# Patient Record
Sex: FEMALE | Race: WHITE | NOT HISPANIC OR LATINO | URBAN - METROPOLITAN AREA
[De-identification: names, ages, dates, MRNs, and addresses within clinical notes are randomized per-mention and may not be internally consistent; named-entity substitution may affect disease eponyms.]

---

## 2017-05-17 ENCOUNTER — AMBULATORY - HEALTHEAST (OUTPATIENT)
Dept: LAB | Facility: CLINIC | Age: 62
End: 2017-05-17

## 2017-05-17 DIAGNOSIS — R53.83 FATIGUE: ICD-10-CM

## 2017-05-17 DIAGNOSIS — E56.9 VITAMIN DEFICIENCY: ICD-10-CM

## 2017-05-25 ENCOUNTER — OFFICE VISIT - HEALTHEAST (OUTPATIENT)
Dept: ENDOCRINOLOGY | Facility: CLINIC | Age: 62
End: 2017-05-25

## 2017-05-25 ENCOUNTER — AMBULATORY - HEALTHEAST (OUTPATIENT)
Dept: ENDOCRINOLOGY | Facility: CLINIC | Age: 62
End: 2017-05-25

## 2017-05-25 DIAGNOSIS — E16.2 HYPOGLYCEMIA: ICD-10-CM

## 2017-05-25 DIAGNOSIS — E56.9 VITAMIN DEFICIENCY: ICD-10-CM

## 2017-05-25 DIAGNOSIS — R53.83 FATIGUE: ICD-10-CM

## 2017-05-25 LAB
CREAT SERPL-MCNC: 0.74 MG/DL (ref 0.6–1.1)
GFR SERPL CREATININE-BSD FRML MDRD: >60 ML/MIN/1.73M2
HBA1C MFR BLD: 5.5 % (ref 3.5–6)

## 2017-05-25 ASSESSMENT — MIFFLIN-ST. JEOR: SCORE: 1948.86

## 2017-06-08 ENCOUNTER — OFFICE VISIT - HEALTHEAST (OUTPATIENT)
Dept: SLEEP MEDICINE | Facility: CLINIC | Age: 62
End: 2017-06-08

## 2017-06-08 ENCOUNTER — AMBULATORY - HEALTHEAST (OUTPATIENT)
Dept: SLEEP MEDICINE | Facility: CLINIC | Age: 62
End: 2017-06-08

## 2017-06-08 DIAGNOSIS — G47.33 OSA ON CPAP: ICD-10-CM

## 2017-06-08 ASSESSMENT — MIFFLIN-ST. JEOR: SCORE: 1947.96

## 2017-06-12 ENCOUNTER — COMMUNICATION - HEALTHEAST (OUTPATIENT)
Dept: ENDOCRINOLOGY | Facility: CLINIC | Age: 62
End: 2017-06-12

## 2017-06-12 DIAGNOSIS — E56.9 VITAMIN DEFICIENCY: ICD-10-CM

## 2017-06-12 DIAGNOSIS — R53.83 FATIGUE: ICD-10-CM

## 2017-06-21 ENCOUNTER — OFFICE VISIT - HEALTHEAST (OUTPATIENT)
Dept: FAMILY MEDICINE | Facility: CLINIC | Age: 62
End: 2017-06-21

## 2017-06-21 DIAGNOSIS — H61.23 BILATERAL IMPACTED CERUMEN: ICD-10-CM

## 2017-06-28 ENCOUNTER — OFFICE VISIT - HEALTHEAST (OUTPATIENT)
Dept: FAMILY MEDICINE | Facility: CLINIC | Age: 62
End: 2017-06-28
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
## 2017-06-28 ASSESSMENT — MIFFLIN-ST. JEOR: SCORE: 1934.35

## 2017-08-14 ENCOUNTER — COMMUNICATION - HEALTHEAST (OUTPATIENT)
Dept: ENDOCRINOLOGY | Facility: CLINIC | Age: 62
End: 2017-08-14

## 2017-08-14 DIAGNOSIS — E56.9 VITAMIN DEFICIENCY: ICD-10-CM

## 2017-08-14 DIAGNOSIS — R53.83 FATIGUE: ICD-10-CM

## 2017-10-06 ENCOUNTER — AMBULATORY - HEALTHEAST (OUTPATIENT)
Dept: NURSING | Facility: CLINIC | Age: 62
End: 2017-10-06

## 2017-11-20 ENCOUNTER — COMMUNICATION - HEALTHEAST (OUTPATIENT)
Dept: LAB | Facility: CLINIC | Age: 62
End: 2017-11-20

## 2017-11-21 ENCOUNTER — AMBULATORY - HEALTHEAST (OUTPATIENT)
Dept: FAMILY MEDICINE | Facility: CLINIC | Age: 62
End: 2017-11-21

## 2017-11-21 DIAGNOSIS — E16.2 HYPOGLYCEMIA: ICD-10-CM

## 2017-11-22 ENCOUNTER — AMBULATORY - HEALTHEAST (OUTPATIENT)
Dept: LAB | Facility: CLINIC | Age: 62
End: 2017-11-22

## 2017-11-22 DIAGNOSIS — E16.2 HYPOGLYCEMIA: ICD-10-CM

## 2017-11-22 LAB
CREAT SERPL-MCNC: 0.72 MG/DL (ref 0.6–1.1)
GFR SERPL CREATININE-BSD FRML MDRD: >60 ML/MIN/1.73M2
HBA1C MFR BLD: 5.4 % (ref 3.5–6)

## 2017-11-30 ENCOUNTER — OFFICE VISIT - HEALTHEAST (OUTPATIENT)
Dept: ENDOCRINOLOGY | Facility: CLINIC | Age: 62
End: 2017-11-30

## 2017-11-30 DIAGNOSIS — E03.9 HYPOTHYROID: ICD-10-CM

## 2017-11-30 ASSESSMENT — MIFFLIN-ST. JEOR: SCORE: 1979.71

## 2017-12-06 ENCOUNTER — HOSPITAL ENCOUNTER (OUTPATIENT)
Dept: ULTRASOUND IMAGING | Facility: CLINIC | Age: 62
Discharge: HOME | End: 2017-12-06
Attending: FAMILY MEDICINE

## 2017-12-06 ENCOUNTER — OFFICE VISIT - HEALTHEAST (OUTPATIENT)
Dept: FAMILY MEDICINE | Facility: CLINIC | Age: 62
End: 2017-12-06

## 2017-12-06 DIAGNOSIS — E04.1 THYROID NODULE: ICD-10-CM

## 2017-12-06 ASSESSMENT — MIFFLIN-ST. JEOR: SCORE: 1966.1

## 2017-12-08 ENCOUNTER — COMMUNICATION - HEALTHEAST (OUTPATIENT)
Dept: FAMILY MEDICINE | Facility: CLINIC | Age: 62
End: 2017-12-08

## 2017-12-08 ENCOUNTER — AMBULATORY - HEALTHEAST (OUTPATIENT)
Dept: FAMILY MEDICINE | Facility: CLINIC | Age: 62
End: 2017-12-08

## 2017-12-08 DIAGNOSIS — E04.2 MULTIPLE THYROID NODULES: ICD-10-CM

## 2017-12-11 ENCOUNTER — AMBULATORY - HEALTHEAST (OUTPATIENT)
Dept: FAMILY MEDICINE | Facility: CLINIC | Age: 62
End: 2017-12-11

## 2017-12-11 DIAGNOSIS — E04.2 MULTIPLE THYROID NODULES: ICD-10-CM

## 2017-12-13 ENCOUNTER — OFFICE VISIT - HEALTHEAST (OUTPATIENT)
Dept: FAMILY MEDICINE | Facility: CLINIC | Age: 62
End: 2017-12-13

## 2017-12-13 ENCOUNTER — HOSPITAL ENCOUNTER (OUTPATIENT)
Dept: ULTRASOUND IMAGING | Facility: CLINIC | Age: 62
Discharge: HOME | End: 2017-12-13
Attending: FAMILY MEDICINE

## 2017-12-13 DIAGNOSIS — R73.03 PRE-DIABETES: ICD-10-CM

## 2017-12-13 DIAGNOSIS — E04.2 MULTIPLE THYROID NODULES: ICD-10-CM

## 2017-12-13 DIAGNOSIS — E03.9 HYPOTHYROIDISM: ICD-10-CM

## 2017-12-13 DIAGNOSIS — L30.9 ECZEMA: ICD-10-CM

## 2017-12-13 DIAGNOSIS — Z00.00 ROUTINE GENERAL MEDICAL EXAMINATION AT A HEALTH CARE FACILITY: ICD-10-CM

## 2017-12-13 RX ORDER — TRIAMCINOLONE ACETONIDE 1 MG/G
CREAM TOPICAL
Qty: 15 G | Refills: 0 | Status: SHIPPED | OUTPATIENT
Start: 2017-12-13

## 2017-12-13 ASSESSMENT — MIFFLIN-ST. JEOR: SCORE: 1964.4

## 2017-12-14 ENCOUNTER — COMMUNICATION - HEALTHEAST (OUTPATIENT)
Dept: ENDOCRINOLOGY | Facility: CLINIC | Age: 62
End: 2017-12-14

## 2017-12-14 DIAGNOSIS — E56.9 VITAMIN DEFICIENCY: ICD-10-CM

## 2017-12-14 DIAGNOSIS — R53.83 FATIGUE: ICD-10-CM

## 2017-12-14 LAB
LAB AP CHARGES (HE HISTORICAL CONVERSION): ABNORMAL
LAB AP INITIAL CYTO EVAL (HE HISTORICAL CONVERSION): ABNORMAL
LAB MED GENERAL PATH INTERP (HE HISTORICAL CONVERSION): ABNORMAL
PATH REPORT.COMMENTS IMP SPEC: ABNORMAL
PATH REPORT.FINAL DX SPEC: ABNORMAL
PATH REPORT.MICROSCOPIC SPEC OTHER STN: ABNORMAL
PATH REPORT.RELEVANT HX SPEC: ABNORMAL
SPECIMEN DESCRIPTION: ABNORMAL

## 2017-12-18 ENCOUNTER — COMMUNICATION - HEALTHEAST (OUTPATIENT)
Dept: FAMILY MEDICINE | Facility: CLINIC | Age: 62
End: 2017-12-18

## 2017-12-18 DIAGNOSIS — C73: ICD-10-CM

## 2017-12-19 ENCOUNTER — COMMUNICATION - HEALTHEAST (OUTPATIENT)
Dept: ENDOCRINOLOGY | Facility: CLINIC | Age: 62
End: 2017-12-19

## 2017-12-19 ENCOUNTER — COMMUNICATION - HEALTHEAST (OUTPATIENT)
Dept: FAMILY MEDICINE | Facility: CLINIC | Age: 62
End: 2017-12-19

## 2017-12-28 ENCOUNTER — OFFICE VISIT - HEALTHEAST (OUTPATIENT)
Dept: ENDOCRINOLOGY | Facility: CLINIC | Age: 62
End: 2017-12-28

## 2017-12-28 DIAGNOSIS — C73 THYROID CANCER (H): ICD-10-CM

## 2017-12-28 LAB
CREAT SERPL-MCNC: 0.72 MG/DL (ref 0.6–1.1)
GFR SERPL CREATININE-BSD FRML MDRD: >60 ML/MIN/1.73M2

## 2017-12-28 ASSESSMENT — MIFFLIN-ST. JEOR: SCORE: 1973.02

## 2017-12-29 LAB — HBA1C MFR BLD: 5.3 % (ref 4.2–6.1)

## 2018-01-02 ENCOUNTER — COMMUNICATION - HEALTHEAST (OUTPATIENT)
Dept: ENDOCRINOLOGY | Facility: CLINIC | Age: 63
End: 2018-01-02

## 2018-01-02 DIAGNOSIS — E03.9 HYPOTHYROIDISM: ICD-10-CM

## 2018-01-02 RX ORDER — LEVOTHYROXINE SODIUM 75 UG/1
TABLET ORAL
Qty: 90 TABLET | Refills: 1 | Status: SHIPPED | OUTPATIENT
Start: 2018-01-02

## 2018-01-02 RX ORDER — LEVOTHYROXINE SODIUM 50 UG/1
TABLET ORAL
Qty: 90 TABLET | Refills: 1 | Status: SHIPPED | OUTPATIENT
Start: 2018-01-02

## 2018-01-15 ENCOUNTER — RECORDS - HEALTHEAST (OUTPATIENT)
Dept: ADMINISTRATIVE | Facility: OTHER | Age: 63
End: 2018-01-15

## 2021-03-18 ENCOUNTER — AMBULATORY - HEALTHEAST (OUTPATIENT)
Dept: NURSING | Facility: CLINIC | Age: 66
End: 2021-03-18

## 2021-04-08 ENCOUNTER — AMBULATORY - HEALTHEAST (OUTPATIENT)
Dept: NURSING | Facility: CLINIC | Age: 66
End: 2021-04-08

## 2021-05-25 ENCOUNTER — RECORDS - HEALTHEAST (OUTPATIENT)
Dept: ADMINISTRATIVE | Facility: CLINIC | Age: 66
End: 2021-05-25

## 2021-05-31 VITALS — HEIGHT: 69 IN | BODY MASS INDEX: 43.4 KG/M2 | WEIGHT: 293 LBS

## 2021-05-31 VITALS — BODY MASS INDEX: 43.4 KG/M2 | HEIGHT: 69 IN | WEIGHT: 293 LBS

## 2021-05-31 VITALS — WEIGHT: 293 LBS | HEIGHT: 69 IN | BODY MASS INDEX: 43.4 KG/M2

## 2021-05-31 VITALS — BODY MASS INDEX: 42.46 KG/M2 | WEIGHT: 287.5 LBS

## 2021-05-31 VITALS — HEIGHT: 69 IN | WEIGHT: 291 LBS | BODY MASS INDEX: 43.1 KG/M2

## 2021-06-02 ENCOUNTER — RECORDS - HEALTHEAST (OUTPATIENT)
Dept: ADMINISTRATIVE | Facility: CLINIC | Age: 66
End: 2021-06-02

## 2021-06-10 NOTE — PROGRESS NOTES
Progress Note    Reason for Visit:      Progress Note:    HPI:    This pleasant 61-year-old female patient is here for follow-up because of symptoms of hypoglycemia.    Component      Latest Ref Rng & Units 11/18/2016 5/17/2017   Creatinine      0.60 - 1.10 mg/dL 0.67    GFR MDRD Af Amer      >60 mL/min/1.73m2 >60    GFR MDRD Non Af Amer      >60 mL/min/1.73m2 >60    TSH      0.30 - 5.00 uIU/mL 4.20 3.24   Vitamin D, Total (25-Hydroxy)      30.0 - 80.0 ng/mL 35.6    Free T4      0.7 - 1.8 ng/dL 0.9 0.9   T3, Total      45 - 175 ng/dL 104 98   Thyroid Peroxidase Ab      0.0 - 5.6 IU/mL <3.0 <3.0         Review of Systems:    Nervous System: No headache, dizziness, fainting or memory loss. No tingling sensation of hand or feet.  Ears: No hearing loss or ringing in the ears  Eyes: No blurring of vision, redness, itching or dryness.  Nose: No nosebleed or loss of smell  Mouth: No mouth sores or loss of taste  Throat: No hoarseness or difficulty swallowing  Neck: No enlarged thyroid or lymph nodes.  Heart: No chest pain, palpitation or irregular heartbeat. No swelling of hands or feet  Lungs: No shortness of breath, cough, night sweats, wheezing or hemoptysis.  Gastrointestinal: No nausea or vomiting, constipation or diarrhea.  No acid reflux, abdominal pain or blood in stools.  Kidney/Bladdr: No polyuria, polydipsia, nocturia or hematuria.  Genital/Sexual: No loss of libido  Skin: No rash, hair loss or hirsutism.  No abnormal striae  Muscles/Joints/Bones: No morning stiffness, muscle aches and pain or loss of height.    Current Medications:  Current Outpatient Prescriptions   Medication Sig     biotin 1 mg tablet Take 1,250 mcg by mouth daily.     blood glucose test strips Use to test blood sugars when you feel shakyDispense brand per patient's insurance at pharmacy discretion.     blood-glucose meter (CONTOUR NEXT EZ METER) Misc Use to test blood sugars when shaky     CALCIUM CARBONATE (CALCIUM 500 ORAL) Take by mouth.      cholecalciferol, vitamin D3, 1,000 unit capsule Take 1,000 Units by mouth daily.     chromium 200 mcg Tab Take by mouth.     echinacea purpurea extract (ECHINACEA) 125 mg Tab Take 1 capsule by mouth daily.     generic lancets Use to test blood sugars when you feel shaky     anabella, zingiber officinalis, 500 mg cap Take by mouth.     GLUCOSAMINE HCL (GLUCOSAMINE, BULK, MISC) Use 367 mg As Directed.     levothyroxine (SYNTHROID, LEVOTHROID) 25 MCG tablet Take 1 tablet (25 mcg total) by mouth daily.     magnesium carbonate-asafetida 250 mg/5 mL Susp Take by mouth.     metFORMIN (GLUCOPHAGE) 500 MG tablet Take 1 tablet with supper     MULTIVITAMIN ORAL Take by mouth.     omega-3 fatty acids-vitamin E (FISH OIL) 1,000 mg cap Take 1 capsule by mouth daily.     Seth's wort 150 mg cap Take by mouth.     zinc 15 mg Tab Take 5 mg by mouth.       Patients Active Problems:  Patient Active Problem List   Diagnosis     Tingling (Paresthesia)     Primary Snoring     Endometrial Polyps     Hammer Toe     Fatigue     Hypoglycemia       History:   reports that she quit smoking about 32 years ago. She started smoking about 38 years ago. She has never used smokeless tobacco. She reports that she drinks alcohol. She reports that she does not use illicit drugs.   reports that she quit smoking about 32 years ago. She started smoking about 38 years ago. She has never used smokeless tobacco. She reports that she drinks alcohol. She reports that she does not use illicit drugs.  History   Smoking Status     Former Smoker     Start date: 6/13/1978     Quit date: 6/13/1984   Smokeless Tobacco     Never Used      reports that she quit smoking about 32 years ago. She started smoking about 38 years ago. She has never used smokeless tobacco. She reports that she drinks alcohol. She reports that she does not use illicit drugs.  History   Sexual Activity     Sexual activity: Not Currently     No past medical history on file.  Family History    Problem Relation Age of Onset     Hyperlipidemia Mother      Cancer Mother      gallbladder     Stroke Mother      Aneurysm Father      Multiple sclerosis Maternal Aunt      Heart disease Maternal Grandfather      Breast cancer Paternal Grandmother      Lupus Maternal Aunt      No past medical history on file.  Past Surgical History:   Procedure Laterality Date     [place] REMOVAL OF TONSILS,<11 Y/O      Description: Tonsillectomy;  Recorded: 10/26/2013;     uterine polyps         Vitals   vitals were not taken for this visit.        Exam  General appearance: The patient looked well, not in acute distress.  Eyes: no evidence of thyroid eye disease.   Retinal exam: No evidence of diabetic retinopathy.  Mouth and Throat: Normal  Neck: No evidence of thyromegaly, enlarged lymph node or tenderness  Chest: Trachea is central. Chest is clear to auscultation and percussion. Breat sounds are normal.  Cardiovascular exam: JVP is not raised. Heart sounds are normal, no murmurs or rub  Peripheral pulses are palpable.   Abdomen: No masses or tenderness.    Back: No vertebral tenderness or kyphosis.  Extremities: No evidence of leg edema.   Skin: Normal to touch.  No abnormal striae  Neurologic exam:  Visual fields are intact by confrontation, grossly intact. No evidence of peripheral neuropathy.  Detailed foot exam normal.        Diagnosis:  Encounter Diagnoses   Name Primary?     Hypoglycemia        Orders:   No orders of the defined types were placed in this encounter.        Assessment and Plan:  Hypoglycemia did extensive lab testing shows that she has high insulin with A1c of 5.7 i.e. insulin resistance I put her metformin 500 mg with supper.  She said that her symptoms of hypoglycemia resolved with especially she said that she would experience shakiness when she drinks caffeine and will stop when she stopped that together with the metformin she is feeling better.    Her TSH was 4.2 I put her on Synthroid 25 mcg daily TSH  came down to 3.24 free T4 0.9 total T3 is 98 she feels much better better energy more confident better mood rollover.    We will increase her Synthroid to, 25 mcg on Monday Wednesday Friday the rest of the day she will take tablet on one tablet only.    Vitamin D 35.6 she is taking 1000 units a day with continue except    I discussed all of these data with the patient should return to clinic in 6 months with labs before I did spend 40 minutes with the patient more than 50% was spent on counseling and managing her care.

## 2021-06-11 NOTE — PROGRESS NOTES
"Assessment:     Rash which is suspicious for bulls-eye rash or erythema chronicum migrans. No specific hx for exposure other than yard work.   No symptoms at present.     Plan:     Because we are in an endemic area, I will go ahead and treat her. This will be with Doxycyclline 100 mg po bid x 21 days. We dscussed   How to take this, and to avoid sun exposure. Can use probiotics along with this.   FU will be prn.     Subjective:      Amy Ivory is a 61 y.o. female who presents to the clinic for a rash, presumed bug bit on her L   Upper abdomen. She noticed this yesterday. Since then, it has gotten a little bigger. No itching, no warmth. She was working   In her garden before this. She denies HA, sore throat, myalgias, cough.     Review of Systems  As noted above. No fevers, no change in BM. No N/V.     Objective:     /72 (Patient Site: Left Arm, Patient Position: Sitting, Cuff Size: Adult Large)  Ht 5' 9\" (1.753 m)  Wt (!) 291 lb (132 kg)  BMI 42.97 kg/m2  Exam of her skin shows a rash on the L upper chest. This has a central red area and then a clearing. This is close to a classic bulls-eye rash.   Mild surrounding redness measuring about 2 1/2 cm. Throat - Posterior pharynx is non-red. No exudate or adenopathy. No cervical adenopathy anteriorly or posteriorly.         "

## 2021-06-11 NOTE — PROGRESS NOTES
Order for Durable Medical Equipment was processed and equipment ordered.   DME provider: [facility]   Date Faxed: 6/8/17  Ordering Provider: Dr. Tsai  Equipment ordered: Supplies

## 2021-06-11 NOTE — PROGRESS NOTES
Patient is a 61-year-old female presents today with hearing loss as well as pulsatile tinnitus in her left ear for several days.      Patient swims a lot, and usually when this happens, she can put eardrops in and then she can feel a bubbling and a fizzing and then her hearing is restored.  She notes the pulsatile tinnitus for the past 5-6 months.  She saw chiropractor, who made an ear adjustment, and it improved for a very short time.  She notes that her hearing in her left ear has gone totally silent in the past several days.  She does have a history of sleep apnea, and she was told by the pulmonologist that the CPAP can dry her inner ears out causing pulsatile tinnitus.  Is contemplating meeting with ENT but decided to come here first to see if there is something easy to do.    Objective     /78 (Patient Site: Left Arm, Patient Position: Sitting)  Pulse 65  Wt (!) 287 lb 8 oz (130.4 kg)  SpO2 99%  Breastfeeding? No  BMI 42.46 kg/m2  General appearance: alert, appears stated age and cooperative  Ears: Bilateral canals blocked by dry, impacted cerumen  Nose: Nares normal. Septum midline. Mucosa normal. No drainage or sinus tenderness.  Throat: lips, mucosa, and tongue normal; teeth and gums normal     Amy was seen today for ear problem.    Diagnoses and all orders for this visit:    Bilateral impacted cerumen  -     Ear Cerumen Removal-Clinic  Cerumen removed successfully with irrigation.  Discussed prevention with debrox.  Recommend no qtips.

## 2021-06-11 NOTE — PROGRESS NOTES
Dear Dr. Ashley Foy MD  [address] [place] [address],    Thank you for the opportunity to participate in the care of Amy Ivory.     She is a 61 y.o. y/o female patient who comes to the sleep medicine clinic for follow up.    She was diagnosed with mild KENNY (11.9)  and has been using CPAP of 9 cwp.    Her symptoms are improved since she started using CPAP. She is not snoring with her device. She denies any PAP intolerance. She is using the device every night and tolerates the pressure well.       Residual AHI: 0.6  Leak: low  Compliance: 100%    Mask Tolerance: excellent  Skin irritation: no    She bought a Z1 device online and uses this device when she travels. I checked the Z1 and the pressure is perfect.      Patient Active Problem List   Diagnosis     Tingling (Paresthesia)     Primary Snoring     Endometrial Polyps     Hammer Toe     Fatigue     Hypoglycemia         Past Surgical History:   Procedure Laterality Date     [place] REMOVAL OF TONSILS,<12 Y/O      Description: Tonsillectomy;  Recorded: 10/26/2013;     uterine polyps         Social History     Social History     Marital status:      Spouse name: N/A     Number of children: N/A     Years of education: N/A     Occupational History     Not on file.     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    Current Outpatient Prescriptions   Medication Sig Dispense Refill     biotin 1 mg tablet Take 1,250 mcg by mouth daily.       blood glucose test strips Use to test blood sugars when you feel shakyDispense brand per patient's insurance at pharmacy discretion. 100 each 2     blood-glucose meter (CONTOUR NEXT EZ METER) Misc Use to test blood sugars when shaky 1 each 0     CALCIUM CARBONATE (CALCIUM 500 ORAL) Take by mouth.       cholecalciferol, vitamin D3, 1,000 unit capsule Take 1,000 Units by mouth daily.       chromium 200 mcg Tab Take by mouth.       echinacea purpurea extract (ECHINACEA) 125 mg Tab Take 1 capsule by mouth daily.       generic lancets Use to test blood sugars when you feel shaky 100 each 1     anabella, zingiber officinalis, 500 mg cap Take by mouth.       GLUCOSAMINE HCL (GLUCOSAMINE, BULK, MISC) Use 367 mg As Directed.       levothyroxine (SYNTHROID, LEVOTHROID) 25 MCG tablet 2 tablets on Monday Wednesday Friday the rest of the days take 1 tablet only. 100 tablet 2     magnesium carbonate-asafetida 250 mg/5 mL Susp Take by mouth.       metFORMIN (GLUCOPHAGE) 500 MG tablet Take 1 tablet with supper 30 tablet 6     MULTIVITAMIN ORAL Take by mouth.       omega-3 fatty acids-vitamin E (FISH OIL) 1,000 mg cap Take 1 capsule by mouth daily.       Seth's wort 150 mg cap Take by mouth.       zinc 15 mg Tab Take 5 mg by mouth.       No current facility-administered medications for this visit.        Allergies   Allergen Reactions     Codeine Nausea Only       Physical Exam:  Pulse 67  Ht 5' 9\" (1.753 m)  Wt (!) 294 lb (133.4 kg)  SpO2 97%  BMI 43.42 kg/m2  BMI:Body mass index is 43.42 kg/(m^2).   GEN: NAD, obese  Neurological: Alert, oriented to time, place, and person.  Psych: normal mood, normal affect     Labs/Studies:     I reviewed the efficacy and compliance report from his device. Data summarized on the HPI and the CPAP compliance " flow sheet.     Lab Results   Component Value Date    WBC 5.4 06/13/2016    HGB 14.8 06/13/2016    HCT 45.6 06/13/2016    MCV 91 06/13/2016     06/13/2016         Chemistry        Component Value Date/Time     06/13/2016 0942    K 4.3 05/25/2017 1403     06/13/2016 0942    CO2 29 06/13/2016 0942    BUN 11 06/13/2016 0942    CREATININE 0.74 05/25/2017 1403     (H) 11/18/2016 0821        Component Value Date/Time    CALCIUM 9.5 11/18/2016 0821    ALKPHOS 94 06/13/2016 0942    AST 19 06/13/2016 0942    ALT 17 06/13/2016 0942    BILITOT 0.5 06/13/2016 0942            Assessment and Plan:  In summary Amy Ivory is a 61 y.o. year old female who is here for follow up.    1. Obstructive Sleep Apnea  Obstructive Sleep Apnea is well controlled with current CPAP settings.  Continue with P= 9 cwp.  I will renew supplies    We counseled the patient on the importannce of using her CPAP device every night and the risks of not treating sleep apnea.      Patient verbalized understanding of these issues, agrees with the plan and all questions were answered today. Patient was given an opportuntity to voice any other symptoms or concerns not listed above. Patient did not have any other symptoms or concerns.      Patient told to return in 24-36 months. Patient instructed to stop at  to schedule appointment before leaving today.    MD MINGO Haywood Board Certified in Internal Medicine and Sleep Medicine  [facility].

## 2021-06-14 NOTE — PROGRESS NOTES
Subjective: Sabrina is a 62-year-old lady who is here to go over her [facility] screening.  She had this done recently and got an abnormal results on her thyroid scan.  During screening for her carotid artery status, incidentally was noted possible nodules in her thyroid.  She is here to follow-up that finding.    Objective: Vital signs: Blood pressure 118/72 height is 5 feet 9 weight is 298 pounds.  This lady is alert and in no acute distress.  I went over with her her other [facility] readings.  Her current signs of plaque.  She is not in atrial fibrillation.  She does not have an abdominal aortic aneurysm.  She has no peripheral arterial disease.  Body mass index was in an unacceptable zone.  They did ultrasounds of her carotids and again while looking in at those on abnormality was noted of the thyroid.  She brings these scans with her but I am unable to adequately reach him.  She has no history of thyroid problems.  She is feeling well.  Exam-neck exam shows no thyromegaly.  I am feeling I think a small nodule in the right lower lobe.  I cannot feel any other nodules.    Assessment: Possible thyroid nodule    Plan: She is referred for an ultrasound of her thyroid with further results based on that.

## 2021-06-14 NOTE — PROGRESS NOTES
Progress Note    Reason for Visit:  Chief Complaint     Follow-up          Progress Note:    HPI:     This patient is here for follow-up because of multiple medical problems.    Component      Latest Ref Rng & Units 5/17/2017 5/25/2017 11/22/2017   Creatinine      0.60 - 1.10 mg/dL  0.74 0.72   GFR MDRD Af Amer      >60 mL/min/1.73m2  >60 >60   GFR MDRD Non Af Amer      >60 mL/min/1.73m2  >60 >60   TSH      0.30 - 5.00 uIU/mL 3.24  3.71   Free T4      0.7 - 1.8 ng/dL 0.9  1.0   Hemoglobin A1c      3.5 - 6.0 %  5.5 5.4   T3, Total      45 - 175 ng/dL 98  90   Thyroid Peroxidase Ab      0.0 - 5.6 IU/mL <3.0     Potassium      3.5 - 5.0 mmol/L  4.3 4.7       Review of Systems:    Nervous System: No headache, dizziness, fainting or memory loss. No tingling sensation of hand or feet.  Ears: No hearing loss or ringing in the ears  Eyes: No blurring of vision, redness, itching or dryness.  Nose: No nosebleed or loss of smell  Mouth: No mouth sores or loss of taste  Throat: No hoarseness or difficulty swallowing  Neck: No enlarged thyroid or lymph nodes.  Heart: No chest pain, palpitation or irregular heartbeat. No swelling of hands or feet  Lungs: No shortness of breath, cough, night sweats, wheezing or hemoptysis.  Gastrointestinal: No nausea or vomiting, constipation or diarrhea.  No acid reflux, abdominal pain or blood in stools.  Kidney/Bladdr: No polyuria, polydipsia, nocturia or hematuria.  Genital/Sexual: No loss of libido  Skin: No rash, hair loss or hirsutism.  No abnormal striae  Muscles/Joints/Bones: No morning stiffness, muscle aches and pain or loss of height.    Current Medications:  Current Outpatient Prescriptions   Medication Sig     biotin 1 mg tablet Take 1,250 mcg by mouth daily.     blood glucose test strips Use to test blood sugars when you feel shakyDispense brand per patient's insurance at pharmacy discretion.     blood-glucose meter (CONTOUR NEXT EZ METER) Misc Use to test blood sugars when shaky      CALCIUM CARBONATE (CALCIUM 500 ORAL) Take by mouth.     cholecalciferol, vitamin D3, 1,000 unit capsule Take 1,000 Units by mouth daily.     chromium 200 mcg Tab Take by mouth.     echinacea purpurea extract (ECHINACEA) 125 mg Tab Take 1 capsule by mouth daily.     generic lancets Use to test blood sugars when you feel shaky     anabella, zingiber officinalis, 500 mg cap Take by mouth.     GLUCOSAMINE HCL (GLUCOSAMINE, BULK, MISC) Use 367 mg As Directed.     levothyroxine (SYNTHROID, LEVOTHROID) 25 MCG tablet 2 tablets on Monday Wednesday Friday the rest of the days take 1 tablet only.     magnesium carbonate-asafetida 250 mg/5 mL Susp Take by mouth.     metFORMIN (GLUCOPHAGE) 500 MG tablet TAKE 1 TABLET BY MOUTH WITH DINNER     MULTIVITAMIN ORAL Take by mouth.     omega-3 fatty acids-vitamin E (FISH OIL) 1,000 mg cap Take 1 capsule by mouth daily.     [place]'s wort 150 mg cap Take by mouth.     zinc 15 mg Tab Take 5 mg by mouth.       Patients Active Problems:  Patient Active Problem List   Diagnosis     Tingling (Paresthesia)     Primary Snoring     Endometrial Polyps     Hammer Toe     Fatigue     Hypoglycemia       History:   reports that she quit smoking about 33 years ago. She started smoking about 39 years ago. She has never used smokeless tobacco. She reports that she drinks alcohol. She reports that she does not use illicit drugs.   reports that she quit smoking about 33 years ago. She started smoking about 39 years ago. She has never used smokeless tobacco. She reports that she drinks alcohol. She reports that she does not use illicit drugs.  History   Smoking Status     Former Smoker     Start date: 6/13/1978     Quit date: 6/13/1984   Smokeless Tobacco     Never Used      reports that she quit smoking about 33 years ago. She started smoking about 39 years ago. She has never used smokeless tobacco. She reports that she drinks alcohol. She reports that she does not use illicit drugs.  History   Sexual  "Activity     Sexual activity: Not Currently     No past medical history on file.  Family History   Problem Relation Age of Onset     Hyperlipidemia Mother      Cancer Mother      gallbladder     Stroke Mother      Aneurysm Father      Multiple sclerosis Maternal Aunt      Heart disease Maternal Grandfather      Breast cancer Paternal Grandmother      Lupus Maternal Aunt      No past medical history on file.  Past Surgical History:   Procedure Laterality Date     [place] REMOVAL OF TONSILS,<12 Y/O      Description: Tonsillectomy;  Recorded: 10/26/2013;     uterine polyps         Vitals   height is 5' 9\" (1.753 m) and weight is 301 lb (136.5 kg) (abnormal). Her blood pressure is 134/80.         Exam  General appearance: The patient looked well, not in acute distress.  Eyes: no evidence of thyroid eye disease.   Retinal exam: No evidence of diabetic retinopathy.  Mouth and Throat: Normal  Neck: No evidence of thyromegaly, enlarged lymph node or tenderness  Chest: Trachea is central. Chest is clear to auscultation and percussion. Breat sounds are normal.  Cardiovascular exam: JVP is not raised. Heart sounds are normal, no murmurs or rub  Peripheral pulses are palpable.   Abdomen: No masses or tenderness.  Back: No vertebral tenderness or kyphosis.  Extremities: No evidence of leg edema.   Skin: Normal to touch.  No abnormal striae  Neurologic exam:  Visual fields are intact by confrontation, grossly intact. No evidence of peripheral neuropathy.  Detailed foot exam normal.        Diagnosis:  No diagnosis found.    Orders:   No orders of the defined types were placed in this encounter.        Assessment and Plan: Hypothyroidism she is currently on Synthroid 25 mcg 2 tablets on Monday Wednesday Friday the rest of the day she takes 1 tablet only.    Her TSH is 3.7 she does feel fatigued and tired I will increase the dose to, 50 mcg daily.    Hypoglycemia I put the patient on metformin 500 mg daily A1c is continuing to come down " 5.6-5.4.  The patient feels well we will continue with that.    She takes vitamin D 2000 units a day calcium 1 a day.    The patient is going to her insurance will allow her only to see [place] I would renew her prescription for 1 year and I told her to check her labs if she felt unwell during this..    Patient return to clinic in 1 year I did spend 40 minutes with the patient more than 50% was spent in counseling and managing his care.

## 2021-06-14 NOTE — PROGRESS NOTES
FEMALE PREVENTIVE EXAM    Assessment & Plan   1. Routine general medical examination at a health care facility:  Not fasting today, labs recently completed.  Will be due for mammography, pap and colonoscopy in 2018.  States insurance is switching, she will be with a different provider.      2. Multiple thyroid nodules:  Cleared for FNA procedure today.     3. Eczema  - triamcinolone (KENALOG) 0.1 % cream; Apply twice daily to affected area  Dispense: 15 g; Refill: 0    4. Hypothyroidism    5. Pre-diabetes    Recommend repeat pap smear if normal every five years  Alcohol use, safety and moderation discussed.  Recommended adequate calcium intake/osteoporosis prevention.  Discussed colon cancer screening at age 50, 45 if -American.  Diet discuss, including moderation of portions sizes, avoiding eating out and fast food and increase in fruits and vegetables.  Discussed safe sex practices.  Discussed & recommended seat belt (& motorcycle helmet) use.      Mayelin Artis CNP    Subjective:   Chief Complaint:  Pre-op Exam (12/13 - [facility] - fine needle aspiration, thyroid nodule) and Eczema (rash on L hand)    HPI:  Amy Ivory is a 62 y.o. female who presents for routine physical exam.  She is a patient of Dr. Foy.  PMH notable for insulin resistance, hypothyroidism, endometrial polyps, otherwise negative.     She was recently found to have two nodules on her thyroid. Right lobe with 2.7cm nodule and left lobe with 3.0cm nodule.  She will be undergoing FNA later today.  She states she was told she needed a physical prior to the procedure though not necessarily a preop?  She has had several procedures in the past, tolerated local and general anesthesia.  No cardiac risk factors.      Has followed with endocrinology for pre diabetes and hypothyroidism.  Symptoms of hypoglycemia have resolved with Metformin and TSH in normal range on 50mcg of levothyroxine.     Eczema:  Left hand with patch not resolved  with OTC hydrocortisone.  Moisturizes twice daily with thick emollient    OB/Gyn History:  Menstrual history: post menopausal  Date of previous pap: 2013 with cotesting  History of abnormal pap: none    Preventive Health:  Reviewed and recommended screening and treatment recommendations:  Mammography: 2016  Colonoscopy: 2008  Immunizations: up to date    Health Habits:    Exercise: yes.  Calcium intake/Osteoporosis prevention: yes  Guns: NO  Sun Screen: YES    PMH:   Patient Active Problem List   Diagnosis     Tingling (Paresthesia)     Primary Snoring     Endometrial Polyps     Hammer Toe     Fatigue     Hypoglycemia       No past medical history on file.    Current Medications: Reviewed   Current Outpatient Prescriptions on File Prior to Visit   Medication Sig Dispense Refill     biotin 1 mg tablet Take 1,250 mcg by mouth daily.       blood glucose test strips Use to test blood sugars when you feel shakyDispense brand per patient's insurance at pharmacy discretion. 100 each 2     blood-glucose meter (CONTOUR NEXT EZ METER) Misc Use to test blood sugars when shaky 1 each 0     CALCIUM CARBONATE (CALCIUM 500 ORAL) Take by mouth.       cholecalciferol, vitamin D3, 1,000 unit capsule Take 1,000 Units by mouth daily.       chromium 200 mcg Tab Take by mouth.       generic lancets Use to test blood sugars when you feel shaky 100 each 1     anabella, zingiber officinalis, 500 mg cap Take by mouth.       GLUCOSAMINE HCL (GLUCOSAMINE, BULK, MISC) Use 367 mg As Directed.       levothyroxine (SYNTHROID, LEVOTHROID) 50 MCG tablet Take 1 tablet (50 mcg total) by mouth daily. 90 tablet 3     magnesium carbonate-asafetida 250 mg/5 mL Susp Take by mouth.       metFORMIN (GLUCOPHAGE) 500 MG tablet TAKE 1 TABLET BY MOUTH WITH DINNER 90 tablet 0     MULTIVITAMIN ORAL Take by mouth.       omega-3 fatty acids-vitamin E (FISH OIL) 1,000 mg cap Take 1 capsule by mouth daily.       Seth's wort 150 mg cap Take by mouth.       zinc 15 mg  "Tab Take 5 mg by mouth.       echinacea purpurea extract (ECHINACEA) 125 mg Tab Take 1 capsule by mouth daily.       No current facility-administered medications on file prior to visit.        Allergies:  Reviewed  is allergic to codeine.    Social History:  Social History     Occupational History     Not on file.     Social History Main Topics     Smoking status: Former Smoker     Start date: 6/13/1978     Quit date: 6/13/1984     Smokeless tobacco: Never Used     Alcohol use Yes      Comment: 1     Drug use: No     Sexual activity: Not Currently       Family History:   Family History   Problem Relation Age of Onset     Hyperlipidemia Mother      Cancer Mother      gallbladder     Stroke Mother      Aneurysm Father      Multiple sclerosis Maternal Aunt      Heart disease Maternal Grandfather      Breast cancer Paternal Grandmother      Lupus Maternal Aunt          Review of Systems:  Complete head to toe review of systems is otherwise negative except as above.    Objective:    /76 (Patient Site: Left Arm, Patient Position: Sitting, Cuff Size: Adult Large)  Pulse 80  Temp 98.5  F (36.9  C) (Oral)   Resp 16  Ht 5' 8.75\" (1.746 m)  Wt (!) 298 lb 8 oz (135.4 kg)  BMI 44.4 kg/m2    GENERAL: Alert, well-appearing female  PSYCH: Pleasant mood, affect appropriate.    SKIN: No atypical lesions  EYES: Conjunctiva pink, sclera white, no exudates. CORBIN.  EOMs intact. Corneal light reflex bilaterally, red reflex present. Undilated fundoscopic exam normal  EARS: TMs pearly grey, no bulging, redness, retraction.     MOUTH: Pharynx moist, pink without exudate. No tonsillar enlargement  NECK: No lymphadenopathy. Thyroid borders smooth without enlargement, nodules.   CV: Regular rate and rhythm without murmurs, rubs or gallops.  RESP: Lung sounds clear, symmetric excursion.   ABDOMEN: BS+. Abdomen soft, nontender to palpation without guarding. No organomegaly  BREASTS: Breasts symmetric, no dimpling, masses or skin " discolorations seen. Areolas and nipples symmetric without discharge. On palpation, breast tissue supple and nontender. No masses or nodules. Axillary and epitrochlear lymph nodes nonpalpable.   MSK: Spine and extremities in alignment.  NEURO: Alert, oriented.   PV : No edema, tenderness or varicosities.

## 2021-06-15 PROBLEM — E16.2 HYPOGLYCEMIA: Status: ACTIVE | Noted: 2017-05-25

## 2021-06-15 NOTE — PROGRESS NOTES
Progress Note    Reason for Visit:  Chief Complaint     Thyroid Problem          Progress Note:    HPI:   I am seeing this patient at the request of the primary care physician because of diagnosis of thyroid cancer.    I saw her in the past because of hypothyroidism.    The patient went on to [facility] of the [facility] did carotid ultrasound and found a thyroid nodule.    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    The patient is on Synthroid 50 mcg daily.     FINDINGS:  RIGHT thyroid lobe measures 4.4 x 2.0 x 1.7 cm. Normal thyroid parenchymal echotexture.  Nodule: 2.7 x 1.5 x 1.5 cm hypoechoic solid nodule with some internal calcification mid pole.   Composition: Solid or almost completely solid 2   Echogenicity: Hypoechoic 2   Shape: Wider-than-tall 0   Margin: Smooth 0   Echogenic Foci: Macrocalcifications 1   Point Total: 4-6 points. TI-RADS 4. Moderately suspicious. FNA if >=1.5cm. Follow if >=1 cm.      LEFT thyroid lobe measures 5.2 x 2.2 x 1.8 cm. Normal thyroid parenchymal echotexture.  Nodule: 3.0 x 2.5 x 1.8 cm isoechoic solid nodule lower pole.  Composition: Solid or almost completely solid 2   Echogenicity: Hyperechoic or isoechoic 1   Shape: Wider-than-tall 0   Margin: Smooth 0   Echogenic Foci: None, or large comet-tail artifacts 0   Point Total: 3 points. TI-RADS 3. Mildly suspicious. FNA if >=2.5 cm. Follow if >=1.5 cm.      Isthmus measures 4 mm. No additional findings.     IMPRESSION:   CONCLUSION:  1.  Right thyroid lobe 2.7 cm nodule meets TI-RADS 4 criteria.  2.  Left thyroid lobe 3.0 cm nodule meets TI-RADS 3 criteria.      Final Diagnosis   A) ULTRASOUND-GUIDED FINE NEEDLE ASPIRATION OF RIGHT THYROID GLAND NODULE:        -  SUSPICIOUS FOR PAPILLARY THYROID CARCINOMA WITH HURTHLE-CELL METAPLASIA         -  NO MICROFOLLICULAR STRUCTURE IDENTIFIED     B) ULTRASOUND-GUIDED FINE NEEDLE ASPIRATION OF LEFT THYROID GLAND NODULE:        -  CYTOLOGIC FINDINGS MOST CONSISTENT WITH COLLOID NODULE, WITH HURTHLE-CELL             METAPLASIA        -  NEGATIVE FOR CYTOLOGICALLY MALIGNANT CELLS        -  ABUNDANT HEMOSIDERIN MACROPHAGES, INDICATIVE OF EARLIER EPISODES OF BLEEDING        -  NO EVIDENCE OF PAPILLARY EPITHELIAL GROWTH, OR MICROFOLLICULAR ARCHITECTURE          Component      Latest Ref Rng & Units 5/17/2017 5/25/2017 11/22/2017   TSH      0.30 - 5.00 uIU/mL 3.24  3.71   Free T4      0.7 - 1.8 ng/dL 0.9  1.0   Hemoglobin A1c      3.5 - 6.0 %  5.5 5.4   T3, Total      45 - 175 ng/dL 98  90   Thyroid Peroxidase Ab      0.0 - 5.6 IU/mL <3.0     PTH      10 - 86 pg/mL      INSULIN, S      2.6 - 24.9 mcIU/mL      Potassium      3.5 - 5.0 mmol/L  4.3 4.7     Review of Systems:    Nervous System: No headache, dizziness, fainting or memory loss. No tingling sensation of hand or feet.  Ears: No hearing loss or ringing in the ears  Eyes: No blurring of vision, redness, itching or dryness.  Nose: No nosebleed or loss of smell  Mouth: No mouth sores or loss of taste  Throat: No hoarseness or difficulty swallowing  Neck: No enlarged thyroid or lymph nodes.  Heart: No chest pain, palpitation or irregular heartbeat. No swelling of hands or feet  Lungs: No shortness of breath, cough, night sweats, wheezing or hemoptysis.  Gastrointestinal: No nausea or vomiting, constipation or diarrhea.  No acid reflux, abdominal pain or blood in stools.  Kidney/Bladdr: No polyuria, polydipsia, nocturia or hematuria.  Genital/Sexual: No loss of libido  Skin: No rash, hair loss or hirsutism.  No abnormal striae  Muscles/Joints/Bones: No morning stiffness, muscle aches and pain or loss of height.    Current Medications:  Current Outpatient Prescriptions   Medication Sig     biotin 1 mg tablet Take 1,250 mcg by mouth daily.     blood glucose test  strips Use to test blood sugars when you feel shakyDispense brand per patient's insurance at pharmacy discretion.     blood-glucose meter (CONTOUR NEXT EZ METER) Misc Use to test blood sugars when shaky     CALCIUM CARBONATE (CALCIUM 500 ORAL) Take by mouth.     cholecalciferol, vitamin D3, 1,000 unit capsule Take 1,000 Units by mouth daily.     chromium 200 mcg Tab Take by mouth.     generic lancets Use to test blood sugars when you feel shaky     anabella, zingiber officinalis, 500 mg cap Take by mouth.     GLUCOSAMINE HCL (GLUCOSAMINE, BULK, MISC) Use 367 mg As Directed.     levothyroxine (SYNTHROID, LEVOTHROID) 50 MCG tablet Take 1 tablet (50 mcg total) by mouth daily.     magnesium carbonate-asafetida 250 mg/5 mL Susp Take by mouth.     metFORMIN (GLUCOPHAGE) 500 MG tablet TAKE 1 TABLET BY MOUTH WITH DINNER     MULTIVITAMIN ORAL Take by mouth.     omega-3 fatty acids-vitamin E (FISH OIL) 1,000 mg cap Take 1 capsule by mouth daily.     Whiteash's wort 150 mg cap Take by mouth.     triamcinolone (KENALOG) 0.1 % cream Apply twice daily to affected area     zinc 15 mg Tab Take 5 mg by mouth.       Patients Active Problems:  Patient Active Problem List   Diagnosis     Primary Snoring     Endometrial Polyps     Hammer Toe     Hypoglycemia     Hypothyroidism       History:   reports that she quit smoking about 33 years ago. She started smoking about 39 years ago. She has never used smokeless tobacco. She reports that she drinks alcohol. She reports that she does not use illicit drugs.   reports that she quit smoking about 33 years ago. She started smoking about 39 years ago. She has never used smokeless tobacco. She reports that she drinks alcohol. She reports that she does not use illicit drugs.  History   Smoking Status     Former Smoker     Start date: 6/13/1978     Quit date: 6/13/1984   Smokeless Tobacco     Never Used      reports that she quit smoking about 33 years ago. She started smoking about 39 years ago.  "She has never used smokeless tobacco. She reports that she drinks alcohol. She reports that she does not use illicit drugs.  History   Sexual Activity     Sexual activity: Not Currently     No past medical history on file.  Family History   Problem Relation Age of Onset     Hyperlipidemia Mother      Cancer Mother      gallbladder     Stroke Mother      Aneurysm Father      Multiple sclerosis Maternal Aunt      Heart disease Maternal Grandfather      Breast cancer Paternal Grandmother      Lupus Maternal Aunt      No past medical history on file.  Past Surgical History:   Procedure Laterality Date     [place] REMOVAL OF TONSILS,<12 Y/O      Description: Tonsillectomy;  Recorded: 10/26/2013;     uterine polyps         Vitals   height is 5' 8.75\" (1.746 m) and weight is 300 lb 6.4 oz (136.3 kg) (abnormal). Her blood pressure is 114/80.         Exam  General appearance: The patient looked well, not in acute distress.  Eyes: no evidence of thyroid eye disease.   Retinal exam: No evidence of diabetic retinopathy.  Mouth and Throat: Normal  Neck: No evidence of thyromegaly, enlarged lymph node or tenderness  Chest: Trachea is central. Chest is clear to auscultation and percussion. Breat sounds are normal.  Cardiovascular exam: JVP is not raised. Heart sounds are normal, no murmurs or rub  Peripheral pulses are palpable.   Abdomen: No masses or tenderness.    Back: No vertebral tenderness or kyphosis.  Extremities: No evidence of leg edema.   Skin: Normal to touch.  No abnormal striae  Neurologic exam:  Visual fields are intact by confrontation, grossly intact. No evidence of peripheral neuropathy.  Detailed foot exam normal.        Diagnosis:  No diagnosis found.    Orders:   No orders of the defined types were placed in this encounter.        Assessment and Plan: Papillary thyroid cancer I discussed the pathophysiology of the disease with the patient the patient wants to monitor it only I explained to her that she should she " must have surgery as soon as possible and I would defer to Dr. ankit Lockwood for total thyroidectomy and I told her after that she needs to have radioactive iodine ablation.    We will check her thyroid function test today and adjust adjusting her dose she is on 50 mcg of Synthroid.    She has 3 diabetes and is taking metformin 500 mg daily we will check A1c    She takes vitamin D 1000 units daily we will check that as well    I had a very very lengthy discussion with the patient about the treatment in the management I did spend 40 minutes with the patient more than 50% was spent in counseling and managing her care.